# Patient Record
Sex: MALE | Race: BLACK OR AFRICAN AMERICAN | NOT HISPANIC OR LATINO | Employment: UNEMPLOYED | URBAN - METROPOLITAN AREA
[De-identification: names, ages, dates, MRNs, and addresses within clinical notes are randomized per-mention and may not be internally consistent; named-entity substitution may affect disease eponyms.]

---

## 2024-01-18 ENCOUNTER — APPOINTMENT (OUTPATIENT)
Dept: PRIMARY CARE | Facility: HOSPITAL | Age: 34
End: 2024-01-18
Payer: COMMERCIAL

## 2024-03-08 ENCOUNTER — LAB (OUTPATIENT)
Dept: LAB | Facility: LAB | Age: 34
End: 2024-03-08
Payer: COMMERCIAL

## 2024-03-08 ENCOUNTER — OFFICE VISIT (OUTPATIENT)
Dept: PRIMARY CARE | Facility: CLINIC | Age: 34
End: 2024-03-08
Payer: COMMERCIAL

## 2024-03-08 VITALS
BODY MASS INDEX: 20.45 KG/M2 | TEMPERATURE: 98 F | OXYGEN SATURATION: 100 % | SYSTOLIC BLOOD PRESSURE: 100 MMHG | DIASTOLIC BLOOD PRESSURE: 64 MMHG | HEIGHT: 67 IN | WEIGHT: 130.3 LBS | HEART RATE: 79 BPM

## 2024-03-08 DIAGNOSIS — K50.113 CROHN'S COLITIS, WITH FISTULA (MULTI): Primary | ICD-10-CM

## 2024-03-08 DIAGNOSIS — Z72.51 HIGH RISK HETEROSEXUAL BEHAVIOR: ICD-10-CM

## 2024-03-08 DIAGNOSIS — K50.113 CROHN'S COLITIS, WITH FISTULA (MULTI): ICD-10-CM

## 2024-03-08 PROBLEM — B00.9 HERPES SIMPLEX: Status: ACTIVE | Noted: 2024-03-08

## 2024-03-08 LAB
25(OH)D3 SERPL-MCNC: 8 NG/ML (ref 30–100)
ALBUMIN SERPL BCP-MCNC: 3.8 G/DL (ref 3.4–5)
ALP SERPL-CCNC: 121 U/L (ref 33–120)
ALT SERPL W P-5'-P-CCNC: 8 U/L (ref 10–52)
ANION GAP SERPL CALC-SCNC: 10 MMOL/L (ref 10–20)
AST SERPL W P-5'-P-CCNC: 12 U/L (ref 9–39)
BASOPHILS # BLD AUTO: 0.03 X10*3/UL (ref 0–0.1)
BASOPHILS NFR BLD AUTO: 0.3 %
BILIRUB SERPL-MCNC: 0.3 MG/DL (ref 0–1.2)
BUN SERPL-MCNC: 14 MG/DL (ref 6–23)
CALCIUM SERPL-MCNC: 9 MG/DL (ref 8.6–10.6)
CHLORIDE SERPL-SCNC: 106 MMOL/L (ref 98–107)
CO2 SERPL-SCNC: 29 MMOL/L (ref 21–32)
CREAT SERPL-MCNC: 0.92 MG/DL (ref 0.5–1.3)
CRP SERPL-MCNC: 1.64 MG/DL
EGFRCR SERPLBLD CKD-EPI 2021: >90 ML/MIN/1.73M*2
EOSINOPHIL # BLD AUTO: 0.14 X10*3/UL (ref 0–0.7)
EOSINOPHIL NFR BLD AUTO: 1.4 %
ERYTHROCYTE [DISTWIDTH] IN BLOOD BY AUTOMATED COUNT: 13.4 % (ref 11.5–14.5)
FERRITIN SERPL-MCNC: 200 NG/ML (ref 20–300)
GLUCOSE SERPL-MCNC: 82 MG/DL (ref 74–99)
HCT VFR BLD AUTO: 39.9 % (ref 41–52)
HCV AB SER QL: NONREACTIVE
HGB BLD-MCNC: 12.4 G/DL (ref 13.5–17.5)
HIV 1+2 AB+HIV1 P24 AG SERPL QL IA: NONREACTIVE
IMM GRANULOCYTES # BLD AUTO: 0.04 X10*3/UL (ref 0–0.7)
IMM GRANULOCYTES NFR BLD AUTO: 0.4 % (ref 0–0.9)
IRON SATN MFR SERPL: 24 % (ref 25–45)
IRON SERPL-MCNC: 81 UG/DL (ref 35–150)
LIPASE SERPL-CCNC: 12 U/L (ref 9–82)
LYMPHOCYTES # BLD AUTO: 2.67 X10*3/UL (ref 1.2–4.8)
LYMPHOCYTES NFR BLD AUTO: 27.2 %
MCH RBC QN AUTO: 29.2 PG (ref 26–34)
MCHC RBC AUTO-ENTMCNC: 31.1 G/DL (ref 32–36)
MCV RBC AUTO: 94 FL (ref 80–100)
MONOCYTES # BLD AUTO: 0.68 X10*3/UL (ref 0.1–1)
MONOCYTES NFR BLD AUTO: 6.9 %
NEUTROPHILS # BLD AUTO: 6.26 X10*3/UL (ref 1.2–7.7)
NEUTROPHILS NFR BLD AUTO: 63.8 %
NRBC BLD-RTO: 0 /100 WBCS (ref 0–0)
PLATELET # BLD AUTO: 360 X10*3/UL (ref 150–450)
POTASSIUM SERPL-SCNC: 3.8 MMOL/L (ref 3.5–5.3)
PROT SERPL-MCNC: 7.1 G/DL (ref 6.4–8.2)
RBC # BLD AUTO: 4.25 X10*6/UL (ref 4.5–5.9)
SODIUM SERPL-SCNC: 141 MMOL/L (ref 136–145)
TIBC SERPL-MCNC: 333 UG/DL (ref 240–445)
TREPONEMA PALLIDUM IGG+IGM AB [PRESENCE] IN SERUM OR PLASMA BY IMMUNOASSAY: REACTIVE
UIBC SERPL-MCNC: 252 UG/DL (ref 110–370)
VIT B12 SERPL-MCNC: 264 PG/ML (ref 211–911)
WBC # BLD AUTO: 9.8 X10*3/UL (ref 4.4–11.3)

## 2024-03-08 PROCEDURE — 80053 COMPREHEN METABOLIC PANEL: CPT

## 2024-03-08 PROCEDURE — 87800 DETECT AGNT MULT DNA DIREC: CPT

## 2024-03-08 PROCEDURE — 36415 COLL VENOUS BLD VENIPUNCTURE: CPT

## 2024-03-08 PROCEDURE — 87389 HIV-1 AG W/HIV-1&-2 AB AG IA: CPT

## 2024-03-08 PROCEDURE — 86318 IA INFECTIOUS AGENT ANTIBODY: CPT

## 2024-03-08 PROCEDURE — 99204 OFFICE O/P NEW MOD 45 MIN: CPT | Performed by: STUDENT IN AN ORGANIZED HEALTH CARE EDUCATION/TRAINING PROGRAM

## 2024-03-08 PROCEDURE — 85025 COMPLETE CBC W/AUTO DIFF WBC: CPT

## 2024-03-08 PROCEDURE — 82306 VITAMIN D 25 HYDROXY: CPT

## 2024-03-08 PROCEDURE — 82728 ASSAY OF FERRITIN: CPT

## 2024-03-08 PROCEDURE — 82607 VITAMIN B-12: CPT

## 2024-03-08 PROCEDURE — 86803 HEPATITIS C AB TEST: CPT

## 2024-03-08 PROCEDURE — 83540 ASSAY OF IRON: CPT

## 2024-03-08 PROCEDURE — 83690 ASSAY OF LIPASE: CPT

## 2024-03-08 PROCEDURE — 86140 C-REACTIVE PROTEIN: CPT

## 2024-03-08 PROCEDURE — 86780 TREPONEMA PALLIDUM: CPT

## 2024-03-08 PROCEDURE — 83550 IRON BINDING TEST: CPT

## 2024-03-08 RX ORDER — SULFASALAZINE 500 MG/1
500 TABLET ORAL 2 TIMES DAILY
COMMUNITY
Start: 2016-06-10 | End: 2024-03-08 | Stop reason: SDUPTHER

## 2024-03-08 RX ORDER — PANTOPRAZOLE SODIUM 40 MG/1
40 TABLET, DELAYED RELEASE ORAL DAILY
Qty: 290 TABLET | Refills: 1 | Status: SHIPPED | OUTPATIENT
Start: 2024-03-08

## 2024-03-08 RX ORDER — IBUPROFEN 600 MG/1
600 TABLET ORAL EVERY 6 HOURS PRN
COMMUNITY
Start: 2024-01-24

## 2024-03-08 RX ORDER — PANTOPRAZOLE SODIUM 40 MG/1
40 TABLET, DELAYED RELEASE ORAL
COMMUNITY
Start: 2022-11-24 | End: 2024-03-08 | Stop reason: SDUPTHER

## 2024-03-08 RX ORDER — SULFASALAZINE 500 MG/1
500 TABLET ORAL 2 TIMES DAILY
Qty: 60 TABLET | Refills: 3 | Status: SHIPPED | OUTPATIENT
Start: 2024-03-08

## 2024-03-08 ASSESSMENT — ENCOUNTER SYMPTOMS: DEPRESSION: 1

## 2024-03-08 ASSESSMENT — PAIN SCALES - GENERAL: PAINLEVEL: 6

## 2024-03-08 NOTE — PROGRESS NOTES
"  Medical record number: [id number]    Subjective   Patient ID: Rufus Aguilar is a 33 y.o. male who presents for Establish Care and Abdominal Pain (\"About a week\").    1. Abdominal Pain, h/o Crohn's colitis  Not painful to eat, but gets full fast  3-4 bowel movements  Sometimes very urgent  Work schedule is difficult  Takes tylenol and ibuprofen 600 mg sparingly  Lidocaine patches also helpful, also lotion    Admission 11/21/22 through 11/24/22  Crohn's colitis flare, hypokalemia  Treated with levo, flagyl, prednisone  Was having abdominal pain then, as well as constipation    1/23/24  Leukocytosis with anemia and neutrophilia   Also Glucose up 124, hypokalemia 3.1 (persistent)         Social History:  - Lives with self  - Feels safe sometimes  - Tobacco: 3-7  cigarettes a day, down from about a pack a day  - Alcohol use: 4 beers in past 2 weeks  - Marijuana use: 2-3 blunts a day  - Illicit drug use: none  - denies current sexual activity, but recently yes  - female partners only  - was told while incarcerated that he had multiple STIs, including syphilis and herpes  - 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    Past Surgical History:  N/A    Review of Systems   All other systems reviewed and are negative.      Objective   /64 (BP Location: Right arm, Patient Position: Sitting, BP Cuff Size: Adult)   Pulse 79   Temp 36.7 °C (98 °F) (Temporal)   Ht 1.702 m (5' 7\")   Wt 59.1 kg (130 lb 4.8 oz)   SpO2 100%   BMI 20.41 kg/m²     Physical Exam  Vitals reviewed.   Constitutional:       General: He is not in acute distress.     Comments: BMI 20, thin body type  Rooms smells like marijuana and tobacco     HENT:      Head: Normocephalic and atraumatic.      Nose: Nose normal.      Mouth/Throat:      Mouth: Mucous membranes are moist.      Pharynx: Oropharynx is clear. "   Eyes:      Extraocular Movements: Extraocular movements intact.      Conjunctiva/sclera: Conjunctivae normal.      Pupils: Pupils are equal, round, and reactive to light.   Cardiovascular:      Rate and Rhythm: Normal rate.      Pulses: Normal pulses.      Heart sounds: Normal heart sounds. No murmur heard.     No friction rub. No gallop.   Pulmonary:      Effort: Pulmonary effort is normal.      Breath sounds: Normal breath sounds.   Abdominal:      General: Abdomen is flat. Bowel sounds are normal.      Palpations: Abdomen is soft.      Tenderness: Negative signs include Latham's sign.      Hernia: No hernia is present.          Comments: Mild TTP RUG, epigastrium   Musculoskeletal:      Cervical back: Normal range of motion and neck supple.   Skin:     General: Skin is warm and dry.      Capillary Refill: Capillary refill takes less than 2 seconds.   Neurological:      General: No focal deficit present.      Mental Status: He is alert.   Psychiatric:         Mood and Affect: Mood normal.         Behavior: Behavior normal.         PHYSICAL EXAM:    Assessment/Plan   Problem List Items Addressed This Visit             ICD-10-CM    Crohn's colitis, with fistula (CMS/HCC) - Primary K50.113    Relevant Medications    sulfaSALAzine (Azulfidine) 500 mg tablet    pantoprazole (ProtoNix) 40 mg EC tablet    lidocaine (XYLOCAINE) 4 % gel gel    Other Relevant Orders    CBC and Auto Differential    Comprehensive metabolic panel    Lipase    Hepatitis C antibody    Vitamin B12    Ferritin    C-reactive protein    Vitamin D 25-Hydroxy,Total (for eval of Vitamin D levels)    Iron and TIBC    Referral to Gastroenterology    Follow Up In Primary Care     Other Visit Diagnoses         Codes    High risk heterosexual behavior     Z72.51    Relevant Orders    Syphilis Screen with Reflex    HIV 1/2 Antigen/Antibody Screen with Reflex to Confirmation    C. trachomatis / N. gonorrhoeae, DNA probe    Hepatitis C antibody                    -------------------------------------------------------------------------------    **This note was entered into the electronic medical record using Dragon medical dictation software, and was not edited thereafter. Please excuse any errors of spelling or grammar.**    -------------------------------------------------------------------------------    OVERALL PLAN:  [ ] For Crohn's with colitis and intermittent abdominal pain: Resume sulfasalazine at low dose 500 mg BID, patient instructed to anticipate GI making possible changes. Also omeprazole for GERD symptom control. Referral placed to GI  [ ] Lidocaine gel also ordered as adjunct for analgesia.  [ ] Blood work ordered. It is for Crohn's workup, to check on persistent hypokalemia, for health screening in male >30 years old.  [ ] Unprotected sex: RPR, HIV, GC/CT.

## 2024-03-09 LAB
C TRACH RRNA SPEC QL NAA+PROBE: NEGATIVE
N GONORRHOEA DNA SPEC QL PROBE+SIG AMP: NEGATIVE
RPR SER QL: NONREACTIVE

## 2024-03-11 ENCOUNTER — DOCUMENTATION (OUTPATIENT)
Dept: PRIMARY CARE | Facility: CLINIC | Age: 34
End: 2024-03-11
Payer: COMMERCIAL

## 2024-03-11 LAB — T PALLIDUM AB SER QL AGGL: REACTIVE

## 2024-03-11 NOTE — PROGRESS NOTES
Reviewed lab results over the phone with patient. Positive Ab test for syphilis and positive treponemal. Patient relates a positive test result while incarcerated recently (within past years) and given penicillin IM injection in buttocks. We discussed that in this context, with no new partners, and no partners informing him of their positive test, the best way to interpret this is recently, appropriately treated syphilis. Tests may remain positive for some time. Patient also given the option of penicillin injection x3 in office (nurse visit), but opts to go with interpretation of recently treated infection.    No new orders placed today.    Patient to make GI appointment for Crohn's and follow up with  me in 2-3 months.

## 2024-05-09 ENCOUNTER — APPOINTMENT (OUTPATIENT)
Dept: GASTROENTEROLOGY | Facility: HOSPITAL | Age: 34
End: 2024-05-09
Payer: COMMERCIAL

## 2024-05-17 ENCOUNTER — APPOINTMENT (OUTPATIENT)
Dept: PRIMARY CARE | Facility: CLINIC | Age: 34
End: 2024-05-17
Payer: COMMERCIAL

## 2024-06-05 ENCOUNTER — APPOINTMENT (OUTPATIENT)
Dept: GASTROENTEROLOGY | Facility: HOSPITAL | Age: 34
End: 2024-06-05
Payer: COMMERCIAL

## 2025-01-31 VITALS
HEIGHT: 66 IN | WEIGHT: 140 LBS | DIASTOLIC BLOOD PRESSURE: 84 MMHG | TEMPERATURE: 98.4 F | HEART RATE: 65 BPM | SYSTOLIC BLOOD PRESSURE: 134 MMHG | OXYGEN SATURATION: 97 % | RESPIRATION RATE: 18 BRPM | BODY MASS INDEX: 22.5 KG/M2

## 2025-01-31 PROCEDURE — 99285 EMERGENCY DEPT VISIT HI MDM: CPT | Performed by: EMERGENCY MEDICINE

## 2025-01-31 PROCEDURE — 99283 EMERGENCY DEPT VISIT LOW MDM: CPT | Performed by: EMERGENCY MEDICINE

## 2025-01-31 PROCEDURE — 99284 EMERGENCY DEPT VISIT MOD MDM: CPT | Performed by: EMERGENCY MEDICINE

## 2025-01-31 ASSESSMENT — COLUMBIA-SUICIDE SEVERITY RATING SCALE - C-SSRS
6. HAVE YOU EVER DONE ANYTHING, STARTED TO DO ANYTHING, OR PREPARED TO DO ANYTHING TO END YOUR LIFE?: NO
1. IN THE PAST MONTH, HAVE YOU WISHED YOU WERE DEAD OR WISHED YOU COULD GO TO SLEEP AND NOT WAKE UP?: NO
2. HAVE YOU ACTUALLY HAD ANY THOUGHTS OF KILLING YOURSELF?: NO

## 2025-01-31 ASSESSMENT — LIFESTYLE VARIABLES
EVER HAD A DRINK FIRST THING IN THE MORNING TO STEADY YOUR NERVES TO GET RID OF A HANGOVER: NO
EVER FELT BAD OR GUILTY ABOUT YOUR DRINKING: NO
TOTAL SCORE: 0
HAVE YOU EVER FELT YOU SHOULD CUT DOWN ON YOUR DRINKING: NO
HAVE PEOPLE ANNOYED YOU BY CRITICIZING YOUR DRINKING: NO

## 2025-01-31 ASSESSMENT — PAIN - FUNCTIONAL ASSESSMENT: PAIN_FUNCTIONAL_ASSESSMENT: 0-10

## 2025-01-31 ASSESSMENT — PAIN SCALES - GENERAL: PAINLEVEL_OUTOF10: 0 - NO PAIN

## 2025-01-31 ASSESSMENT — PAIN DESCRIPTION - PROGRESSION: CLINICAL_PROGRESSION: NOT CHANGED

## 2025-02-01 ENCOUNTER — HOSPITAL ENCOUNTER (EMERGENCY)
Facility: HOSPITAL | Age: 35
Discharge: HOME | End: 2025-02-01
Attending: EMERGENCY MEDICINE
Payer: COMMERCIAL

## 2025-02-01 DIAGNOSIS — F34.1 PERSISTENT DEPRESSIVE DISORDER: Primary | ICD-10-CM

## 2025-02-01 NOTE — DISCHARGE INSTRUCTIONS
You were seen today due to family concerns regarding behavior where you are avoiding them.  You did not express any thoughts of harming yourself or harming anyone else, but we did recommend that you follow-up outpatient.  We were planning to have LUIS MANUEL, our psychiatry team, see you.  You wanted to leave prior to them seeing you, which I think is reasonable.  I will provide you with a list of outpatient resources which you can access yourself.  I will also put in a referral for psychiatry in case you would like to see a psychiatrist.  If you have any thoughts of harming yourself or anyone else, I would recommend you return here.

## 2025-02-01 NOTE — ED TRIAGE NOTES
"Enters ED requesting \"evaluation.\" Denies SI/HI  but does endorse anxiety depression. Baseline PTSD, stricturing Crohn's disease dx at age 18 in the setting of bowel obstruction, s/p ileocecal resection and reanastomosis (2008).   "

## 2025-02-01 NOTE — ED PROVIDER NOTES
History of Present Illness     History provided by: Patient and Family Member  Limitations to History: None  External Records Reviewed with Brief Summary: None    HPI:  Rufus Aguilar is a 34 y.o. male past medical history of PTSD anxiety depression presents today for familial concern.  Per patient's brother, the patient has been more self isolating recently.  He feels like he does not want to talk to his family as much or interact with them.  When asked the patient, he feels that he is stressed out, has had some traumatic events in his life this family wants to talk about frequently, he feels that this is stressful for him and he does not want to do this as much anymore.  Given this, he is opted to spend more time by himself.  He states he does have some depression and anxiety, does not follow with anybody.  He denies any suicidal ideation, homicidal ideation, or auditory visual hallucinations.  He states that he would be willing to talk to someone about his mental health, however, does not feel like he needs to be hospitalized at the moment.    Physical Exam   Triage vitals:  T 36.9 °C (98.4 °F)  HR 65  /84  RR 18  O2 97 %      Physical Exam  Vitals and nursing note reviewed.   Constitutional:       General: He is not in acute distress.     Appearance: Normal appearance. He is not ill-appearing or diaphoretic.   HENT:      Head: Normocephalic and atraumatic.      Mouth/Throat:      Mouth: Mucous membranes are moist.      Pharynx: No oropharyngeal exudate or posterior oropharyngeal erythema.   Eyes:      General: No scleral icterus.     Extraocular Movements: Extraocular movements intact.      Pupils: Pupils are equal, round, and reactive to light.   Cardiovascular:      Rate and Rhythm: Normal rate and regular rhythm.      Pulses: Normal pulses.      Heart sounds: Normal heart sounds. No murmur heard.     No gallop.   Pulmonary:      Effort: Pulmonary effort is normal. No respiratory distress.       Breath sounds: Normal breath sounds. No stridor. No wheezing, rhonchi or rales.   Abdominal:      General: Bowel sounds are normal. There is no distension.      Palpations: Abdomen is soft. There is no mass.      Tenderness: There is no abdominal tenderness.      Hernia: No hernia is present.   Musculoskeletal:         General: No swelling, deformity or signs of injury. Normal range of motion.      Cervical back: Normal range of motion and neck supple. No tenderness.   Skin:     General: Skin is warm.      Capillary Refill: Capillary refill takes less than 2 seconds.      Findings: No erythema, lesion or rash.   Neurological:      General: No focal deficit present.      Mental Status: He is alert. Mental status is at baseline.   Psychiatric:      Comments: Somewhat flat affect with poor eye contact, but linear thought process with logical reasoning.  Reasonable insight.  Denies ODALIS, HI, WILNER          Medical Decision Making & ED Course   Medical Decision Makin y.o. male past medical history of PTSD anxiety depression presents today for familial concern.  Patient does not appear to be in a behavioral health crisis, does not appear to be an imminent threat to himself or others.  Given this, I believe the patient is likely more appropriate for outpatient follow-up and resources.  Given this, I did discuss with him possibly having EPAT see him.  Initially, he did feel that he was amenable to this.  However, given the fact that there was a prolonged period of time prior to them seeing him, he opted to leave with our list of resources.  Given this, we will discharge him home with a list of resources, I did also provide him with the information for the [facility], which does have an urgent care clinic for his behavioral health.  All questions were answered prior to discharge, turn precaution provided.  ----      Differential diagnoses considered include but are not limited to: Depression, anxiety      Social Determinants of Health which Significantly Impact Care: None identified     EKG Independent Interpretation: EKG not obtained    Independent Result Review and Interpretation: Relevant laboratory and radiographic results were reviewed and independently interpreted by myself.  As necessary, they are commented on in the ED Course.    Chronic conditions affecting the patient's care: As documented above in [facility] [place]    The patient was discussed with the following consultants/services: None    Care Considerations: As documented above in [facility] [place]    ED Course:  Diagnoses as of 02/01/25 0531   Persistent depressive disorder     Disposition   As a result of the work-up, the patient was discharged home.  he was informed of his diagnosis and instructed to come back with any concerns or worsening of condition.  he and was agreeable to the plan as discussed above.  he was given the opportunity to ask questions.  All of the patient's questions were answered.    Procedures   Procedures    Patient seen and discussed with ED attending physician.    Emery Jin MD  Emergency Medicine    Emery Jin MD  Resident  02/01/25 0536    -----------------------------------------    This patient was seen by the resident physician. I have seen and examined the patient, agree with the workup, evaluation, management and diagnosis. The care plan has been discussed and I concur.    My assessment reveals the following:    HPI: Patient is a 33 y/o male with h/o PTSD, depression, and anxiety presenting at the request of his brother for mental health evaluation. Patient reports that he had a long day and did not want to be here. Reports health and life stressors. Has a PMD to address health issues. Does not have any outpatient mental health provider, but is willing to be referred to one. Denies SI/HI/VH/command AH. No physical complaints.     PE:  Vital signs reviewed in nursing triage note, EMR flowsheets, and at patient's bedside  GEN: Patient  is awake, alert, calm, cooperative, and in mild psych distress.  HEAD: Normocephalic and atraumatic.  EYES: Anicteric sclera.  MOUTH: Mucous membranes moist.  CV: Regular rate and rhythm. (+) s1/s2. No murmurs/rubs/gallops.  PULM: CTAB. No wheezes, rales, or crackles.  GI: Soft, non-tender, non-distended without rebound or guarding.  EXT: No deformities noted.  NEURO: Moves all extremities.  SKIN: Warm, dry. No erythema or ecchymosis.    Medical Decision Making:  - EPAT for outpatient mental health resources for follow up.  - Patient advised to return to the ED for any worsening or new symptoms.     Differential Diagnoses Considered: Depression, PTSD, Mood disorder    Chronic Medical Conditions Significantly Affecting Care: PTDS, Depression, Anxiety    Escalation of Care:  Appropriate for outpatient management    MD Brian Rosen MD  02/01/25 [id number]

## 2025-08-26 ENCOUNTER — OFFICE VISIT (OUTPATIENT)
Facility: HOSPITAL | Age: 35
End: 2025-08-26
Payer: COMMERCIAL

## 2025-08-26 VITALS
OXYGEN SATURATION: 96 % | SYSTOLIC BLOOD PRESSURE: 117 MMHG | BODY MASS INDEX: 22.16 KG/M2 | TEMPERATURE: 98.4 F | HEART RATE: 97 BPM | WEIGHT: 137.9 LBS | HEIGHT: 66 IN | DIASTOLIC BLOOD PRESSURE: 84 MMHG

## 2025-08-26 DIAGNOSIS — J01.90 ACUTE BACTERIAL SINUSITIS: Primary | ICD-10-CM

## 2025-08-26 DIAGNOSIS — B96.89 ACUTE BACTERIAL SINUSITIS: Primary | ICD-10-CM

## 2025-08-26 PROCEDURE — 3008F BODY MASS INDEX DOCD: CPT | Performed by: STUDENT IN AN ORGANIZED HEALTH CARE EDUCATION/TRAINING PROGRAM

## 2025-08-26 PROCEDURE — 99213 OFFICE O/P EST LOW 20 MIN: CPT | Performed by: STUDENT IN AN ORGANIZED HEALTH CARE EDUCATION/TRAINING PROGRAM

## 2025-08-26 PROCEDURE — 99212 OFFICE O/P EST SF 10 MIN: CPT

## 2025-08-26 RX ORDER — AMOXICILLIN AND CLAVULANATE POTASSIUM 875; 125 MG/1; MG/1
875 TABLET, FILM COATED ORAL 2 TIMES DAILY
Qty: 20 TABLET | Refills: 0 | Status: SHIPPED | OUTPATIENT
Start: 2025-08-26 | End: 2025-09-05

## 2025-08-26 ASSESSMENT — PAIN SCALES - GENERAL: PAINLEVEL_OUTOF10: 3

## 2025-09-22 ENCOUNTER — APPOINTMENT (OUTPATIENT)
Dept: OPHTHALMOLOGY | Facility: CLINIC | Age: 35
End: 2025-09-22
Payer: COMMERCIAL